# Patient Record
Sex: FEMALE | Race: OTHER | NOT HISPANIC OR LATINO | URBAN - METROPOLITAN AREA
[De-identification: names, ages, dates, MRNs, and addresses within clinical notes are randomized per-mention and may not be internally consistent; named-entity substitution may affect disease eponyms.]

---

## 2024-05-12 ENCOUNTER — EMERGENCY (EMERGENCY)
Facility: HOSPITAL | Age: 19
LOS: 1 days | Discharge: HOME | End: 2024-05-12
Attending: EMERGENCY MEDICINE | Admitting: EMERGENCY MEDICINE
Payer: MEDICAID

## 2024-05-12 VITALS
SYSTOLIC BLOOD PRESSURE: 118 MMHG | OXYGEN SATURATION: 99 % | TEMPERATURE: 98 F | DIASTOLIC BLOOD PRESSURE: 70 MMHG | RESPIRATION RATE: 16 BRPM | HEART RATE: 87 BPM

## 2024-05-12 VITALS
SYSTOLIC BLOOD PRESSURE: 110 MMHG | DIASTOLIC BLOOD PRESSURE: 71 MMHG | RESPIRATION RATE: 16 BRPM | TEMPERATURE: 99 F | OXYGEN SATURATION: 98 % | HEART RATE: 92 BPM

## 2024-05-12 PROCEDURE — 99283 EMERGENCY DEPT VISIT LOW MDM: CPT

## 2024-05-12 RX ORDER — CEPHALEXIN 500 MG
1 CAPSULE ORAL
Qty: 28 | Refills: 0
Start: 2024-05-12 | End: 2024-05-18

## 2024-05-12 RX ORDER — CEPHALEXIN 500 MG
500 CAPSULE ORAL ONCE
Refills: 0 | Status: COMPLETED | OUTPATIENT
Start: 2024-05-12 | End: 2024-05-12

## 2024-05-12 RX ADMIN — Medication 500 MILLIGRAM(S): at 22:47

## 2024-05-12 NOTE — ED PROVIDER NOTE - PATIENT PORTAL LINK FT
You can access the FollowMyHealth Patient Portal offered by [facility] by registering at the following website: [email or web address]. By joining [employer]’s FollowMyHealth portal, you will also be able to view your health information using other applications (apps) compatible with our system.

## 2024-05-12 NOTE — ED ADULT NURSE NOTE - NSFALLUNIVINTERV_ED_ALL_ED
Bed/Stretcher in lowest position, wheels locked, appropriate side rails in place/Call bell, personal items and telephone in reach/Instruct patient to call for assistance before getting out of bed/chair/stretcher/Non-slip footwear applied when patient is off stretcher/[place] to call system/Physically safe environment - no spills, clutter or unnecessary equipment/Purposeful proactive rounding/Room/bathroom lighting operational, light cord in reach

## 2024-05-12 NOTE — ED PROVIDER NOTE - PHYSICAL EXAMINATION
GENERAL: well appearing in no acute distress, non-toxic appearing  MSK: ROM intact, +right mid thigh ttp, firm/indurated  SKIN: +mid thigh scab with surrounding erythema approx 5x8 cm

## 2024-05-12 NOTE — ED PROVIDER NOTE - NSFOLLOWUPINSTRUCTIONS_ED_ALL_ED_FT
You are seen in the ED for your right mid thigh pain and redness after a popped pimple.  We diagnosed you with cellulitis, there is no sign of abscess.    We gave you a dose of antibiotics in the ED and sent a prescription for the remainder of your antibiotic course to your pharmacy.  Your antibiotic is Keflex, please take 1 tablet 4 times a day for the next 7 days.    You may take Tylenol, and/or Ibuprofen as needed for pain. Please follow dosing instructions on medication labeling.    SEEK IMMEDIATE MEDICAL CARE IF YOU HAVE ANY OF THE FOLLOWING SYMPTOMS: chills, fever, muscle aches, or red streaking from the area.

## 2024-05-12 NOTE — ED PROVIDER NOTE - NS ED ATTENDING STATEMENT MOD
I have seen and examined this patient and fully participated in the care of this patient as the teaching attending.  The service was shared with the CASSANDRA.  I reviewed and verified the documentation.

## 2024-05-12 NOTE — ED PROVIDER NOTE - CLINICAL SUMMARY MEDICAL DECISION MAKING FREE TEXT BOX
18-year-old female with no past medical or past surgical history presents with right mid thigh redness and pain.  States her symptoms started with a pimple in the mid thigh a week ago which popped and drained pus, however since then has noticed redness spreading from the area with increasing pain and mild swelling.  Any fever/chills, no history of previous abscess or cellulitis.  AVSS,+right mid thigh ttp, firm/indurated, +right mid thigh ttp, firm/indurated, no fluctuance.  History exam concerning for cellulitis, no area of fluctuance concerning for abscess, central area is scabbed over consistent with prior drained pimple/abscess.  Will provide antibiotics, send Rx to pharmacy, give follow-up and return precautions and discharge.

## 2024-05-12 NOTE — ED PROVIDER NOTE - ATTENDING CONTRIBUTION TO CARE
I saw and evaluated the patient. I discussed the case with the CASSANDRA/resident and agree with the findings and helped develop the plan of care as documented in the CASSANDRA/resident's note. I agree with the findings and plan of care as documented in the CASSANDRA/resident's note.

## 2024-05-15 DIAGNOSIS — L53.9 ERYTHEMATOUS CONDITION, UNSPECIFIED: ICD-10-CM

## 2024-05-15 DIAGNOSIS — L03.115 CELLULITIS OF RIGHT LOWER LIMB: ICD-10-CM

## 2024-05-15 DIAGNOSIS — Z91.018 ALLERGY TO OTHER FOODS: ICD-10-CM

## 2024-06-12 ENCOUNTER — INPATIENT (INPATIENT)
Facility: HOSPITAL | Age: 19
LOS: 0 days | Discharge: HOME | DRG: 872 | End: 2024-06-13
Attending: STUDENT IN AN ORGANIZED HEALTH CARE EDUCATION/TRAINING PROGRAM | Admitting: STUDENT IN AN ORGANIZED HEALTH CARE EDUCATION/TRAINING PROGRAM
Payer: MEDICAID

## 2024-06-12 VITALS
TEMPERATURE: 98 F | RESPIRATION RATE: 16 BRPM | WEIGHT: 119.93 LBS | SYSTOLIC BLOOD PRESSURE: 117 MMHG | DIASTOLIC BLOOD PRESSURE: 75 MMHG | HEART RATE: 109 BPM | HEIGHT: 61 IN | OXYGEN SATURATION: 99 %

## 2024-06-12 LAB
ANION GAP SERPL CALC-SCNC: 13 MMOL/L — SIGNIFICANT CHANGE UP (ref 9–16)
BUN SERPL-MCNC: 9 MG/DL — SIGNIFICANT CHANGE UP (ref 7–23)
CALCIUM SERPL-MCNC: 9.2 MG/DL — SIGNIFICANT CHANGE UP (ref 8.5–10.5)
CHLORIDE SERPL-SCNC: 96 MMOL/L — SIGNIFICANT CHANGE UP (ref 96–108)
CO2 SERPL-SCNC: 23 MMOL/L — SIGNIFICANT CHANGE UP (ref 22–31)
CREAT SERPL-MCNC: 1.09 MG/DL — SIGNIFICANT CHANGE UP (ref 0.5–1.3)
EGFR: 75 ML/MIN/1.73M2 — SIGNIFICANT CHANGE UP
GLUCOSE SERPL-MCNC: 134 MG/DL — HIGH (ref 70–99)
HCG SERPL-ACNC: <1 MIU/ML — SIGNIFICANT CHANGE UP
HCT VFR BLD CALC: 39.9 % — SIGNIFICANT CHANGE UP (ref 34.5–45)
HGB BLD-MCNC: 13.3 G/DL — SIGNIFICANT CHANGE UP (ref 11.5–15.5)
LACTATE BLDV-MCNC: 1.4 MMOL/L — SIGNIFICANT CHANGE UP (ref 0.5–2)
MCHC RBC-ENTMCNC: 28.8 PG — SIGNIFICANT CHANGE UP (ref 27–34)
MCHC RBC-ENTMCNC: 33.3 GM/DL — SIGNIFICANT CHANGE UP (ref 32–36)
MCV RBC AUTO: 86.4 FL — SIGNIFICANT CHANGE UP (ref 80–100)
NRBC # BLD: 0 /100 WBCS — SIGNIFICANT CHANGE UP (ref 0–0)
PLATELET # BLD AUTO: 232 K/UL — SIGNIFICANT CHANGE UP (ref 150–400)
POTASSIUM SERPL-MCNC: 3.4 MMOL/L — LOW (ref 3.5–5.3)
POTASSIUM SERPL-SCNC: 3.4 MMOL/L — LOW (ref 3.5–5.3)
RBC # BLD: 4.62 M/UL — SIGNIFICANT CHANGE UP (ref 3.8–5.2)
RBC # FLD: 12.2 % — SIGNIFICANT CHANGE UP (ref 10.3–14.5)
SODIUM SERPL-SCNC: 132 MMOL/L — SIGNIFICANT CHANGE UP (ref 132–145)
WBC # BLD: 13.71 K/UL — HIGH (ref 3.8–10.5)
WBC # FLD AUTO: 13.71 K/UL — HIGH (ref 3.8–10.5)

## 2024-06-12 PROCEDURE — 99285 EMERGENCY DEPT VISIT HI MDM: CPT

## 2024-06-12 PROCEDURE — 72193 CT PELVIS W/DYE: CPT | Mod: 26,MC

## 2024-06-12 RX ORDER — AMPICILLIN SODIUM AND SULBACTAM SODIUM 250; 125 MG/ML; MG/ML
INJECTION, POWDER, FOR SUSPENSION INTRAMUSCULAR; INTRAVENOUS
Refills: 0 | Status: DISCONTINUED | OUTPATIENT
Start: 2024-06-12 | End: 2024-06-13

## 2024-06-12 RX ORDER — SODIUM CHLORIDE 9 MG/ML
1000 INJECTION INTRAMUSCULAR; INTRAVENOUS; SUBCUTANEOUS ONCE
Refills: 0 | Status: COMPLETED | OUTPATIENT
Start: 2024-06-12 | End: 2024-06-12

## 2024-06-12 RX ORDER — VANCOMYCIN HCL 1 G
1000 VIAL (EA) INTRAVENOUS ONCE
Refills: 0 | Status: COMPLETED | OUTPATIENT
Start: 2024-06-12 | End: 2024-06-12

## 2024-06-12 RX ORDER — IOHEXOL 300 MG/ML
30 INJECTION, SOLUTION INTRAVENOUS ONCE
Refills: 0 | Status: COMPLETED | OUTPATIENT
Start: 2024-06-12 | End: 2024-06-12

## 2024-06-12 RX ORDER — AMPICILLIN SODIUM AND SULBACTAM SODIUM 250; 125 MG/ML; MG/ML
3 INJECTION, POWDER, FOR SUSPENSION INTRAMUSCULAR; INTRAVENOUS EVERY 6 HOURS
Refills: 0 | Status: DISCONTINUED | OUTPATIENT
Start: 2024-06-12 | End: 2024-06-13

## 2024-06-12 RX ORDER — EPINEPHRINE 0.3 MG/.3ML
0.3 INJECTION INTRAMUSCULAR; SUBCUTANEOUS ONCE
Refills: 0 | Status: COMPLETED | OUTPATIENT
Start: 2024-06-12 | End: 2024-06-12

## 2024-06-12 RX ORDER — AMPICILLIN SODIUM AND SULBACTAM SODIUM 250; 125 MG/ML; MG/ML
3 INJECTION, POWDER, FOR SUSPENSION INTRAMUSCULAR; INTRAVENOUS ONCE
Refills: 0 | Status: COMPLETED | OUTPATIENT
Start: 2024-06-12 | End: 2024-06-12

## 2024-06-12 RX ORDER — IPRATROPIUM/ALBUTEROL SULFATE 18-103MCG
3 AEROSOL WITH ADAPTER (GRAM) INHALATION ONCE
Refills: 0 | Status: COMPLETED | OUTPATIENT
Start: 2024-06-12 | End: 2024-06-12

## 2024-06-12 RX ORDER — FAMOTIDINE 10 MG/ML
20 INJECTION INTRAVENOUS ONCE
Refills: 0 | Status: COMPLETED | OUTPATIENT
Start: 2024-06-12 | End: 2024-06-12

## 2024-06-12 RX ORDER — DIPHENHYDRAMINE HCL 50 MG
50 CAPSULE ORAL ONCE
Refills: 0 | Status: COMPLETED | OUTPATIENT
Start: 2024-06-12 | End: 2024-06-12

## 2024-06-12 RX ADMIN — AMPICILLIN SODIUM AND SULBACTAM SODIUM 200 GRAM(S): 250; 125 INJECTION, POWDER, FOR SUSPENSION INTRAMUSCULAR; INTRAVENOUS at 12:42

## 2024-06-12 RX ADMIN — SODIUM CHLORIDE 1000 MILLILITER(S): 9 INJECTION INTRAMUSCULAR; INTRAVENOUS; SUBCUTANEOUS at 12:20

## 2024-06-12 RX ADMIN — EPINEPHRINE 0.3 MILLIGRAM(S): 0.3 INJECTION INTRAMUSCULAR; SUBCUTANEOUS at 10:40

## 2024-06-12 RX ADMIN — Medication 1000 MILLIGRAM(S): at 20:22

## 2024-06-12 RX ADMIN — AMPICILLIN SODIUM AND SULBACTAM SODIUM 3 GRAM(S): 250; 125 INJECTION, POWDER, FOR SUSPENSION INTRAMUSCULAR; INTRAVENOUS at 13:49

## 2024-06-12 RX ADMIN — AMPICILLIN SODIUM AND SULBACTAM SODIUM 200 GRAM(S): 250; 125 INJECTION, POWDER, FOR SUSPENSION INTRAMUSCULAR; INTRAVENOUS at 19:12

## 2024-06-12 RX ADMIN — SODIUM CHLORIDE 1000 MILLILITER(S): 9 INJECTION INTRAMUSCULAR; INTRAVENOUS; SUBCUTANEOUS at 07:43

## 2024-06-12 RX ADMIN — Medication 125 MILLIGRAM(S): at 10:45

## 2024-06-12 RX ADMIN — AMPICILLIN SODIUM AND SULBACTAM SODIUM 200 GRAM(S): 250; 125 INJECTION, POWDER, FOR SUSPENSION INTRAMUSCULAR; INTRAVENOUS at 07:38

## 2024-06-12 RX ADMIN — Medication 250 MILLIGRAM(S): at 07:43

## 2024-06-12 RX ADMIN — SODIUM CHLORIDE 1000 MILLILITER(S): 9 INJECTION INTRAMUSCULAR; INTRAVENOUS; SUBCUTANEOUS at 20:22

## 2024-06-12 RX ADMIN — AMPICILLIN SODIUM AND SULBACTAM SODIUM 3 GRAM(S): 250; 125 INJECTION, POWDER, FOR SUSPENSION INTRAMUSCULAR; INTRAVENOUS at 20:22

## 2024-06-12 RX ADMIN — AMPICILLIN SODIUM AND SULBACTAM SODIUM 200 GRAM(S): 250; 125 INJECTION, POWDER, FOR SUSPENSION INTRAMUSCULAR; INTRAVENOUS at 23:51

## 2024-06-12 RX ADMIN — IOHEXOL 30 MILLILITER(S): 300 INJECTION, SOLUTION INTRAVENOUS at 07:51

## 2024-06-12 RX ADMIN — AMPICILLIN SODIUM AND SULBACTAM SODIUM 3 GRAM(S): 250; 125 INJECTION, POWDER, FOR SUSPENSION INTRAMUSCULAR; INTRAVENOUS at 20:21

## 2024-06-12 RX ADMIN — Medication 50 MILLIGRAM(S): at 10:45

## 2024-06-12 RX ADMIN — Medication 3 MILLILITER(S): at 11:00

## 2024-06-12 RX ADMIN — FAMOTIDINE 20 MILLIGRAM(S): 10 INJECTION INTRAVENOUS at 10:45

## 2024-06-12 RX ADMIN — Medication 3 MILLILITER(S): at 10:50

## 2024-06-12 NOTE — ED PROVIDER NOTE - PHYSICAL EXAMINATION
Constitutional:  Well appearing, awake, alert, oriented  and in no apparent distress.  HEENT: Airway patent, Nasal mucosa clear. Mouth with normal mucosa.   Eyes: Clear bilaterally, pupils equal, round and reactive to light.  Cardiac: Normal rate, regular rhythm.  Respiratory: Breath sounds clear and equal bilaterally.  GI: Abdomen soft, non-tender, no guarding.  MSK: Spine appears normal, range of motion is not limited, no muscle or joint tenderness  Neuro: Alert and oriented, no focal deficits, no motor or sensory deficits.  Pelvis: Large 6x7 firm indurated mass to L lower abdomen/pelvis, minimal drainage of pus-like fluid upon expression, no communication within vaginal canal (chaperone was nurse Tessa)

## 2024-06-12 NOTE — ED ADULT NURSE REASSESSMENT NOTE - NS ED NURSE REASSESS COMMENT FT1
Called to patient bedside after CT scan, endorsing nausea and increased nasal congestion. FELICITA Sharp called to bedside to assess. Pt noted to have slight wheezing and hives with persistent nausea. IM Epi, 20mg pepcid, 120mg solumedrol, 50mg benadryl, 1000ml NS, and 3 duo-nebs given. Pt reassess and describes improvement in itchiness and congestion.
pt resting comfortably, denies any pain. pending admission at this time.
received pt from previous shift, endorsed from previous shift for continuity of care. pt presents to the ED d/t complaints of worsening abscess. pt received abx, pending CT with contrast at this time. endorses mild pain with movement, does not want any medication at this time.

## 2024-06-12 NOTE — ED PROVIDER NOTE - OBJECTIVE STATEMENT
18-year-old female with no past medical history presenting to the emergency room with swelling to the groin.  Patient states for the past 2 days she has noticed swelling in the left  lower pelvis, associated with redness, and occasional drainage when she pushes on it.  Also complaining of subjective fevers.  States that she had an abscess on her right thigh that was drained and also was cellulitic requiring antibiotics.  Non-smoker, states that she smokes "weed", no IVDU.  States she does wax.

## 2024-06-12 NOTE — ED ADULT NURSE REASSESSMENT NOTE - GENERAL PATIENT STATE
comfortable appearance/cooperative/no change observed/resting/sleeping
comfortable appearance/smiling/interactive

## 2024-06-12 NOTE — ED PROVIDER NOTE - BIRTH SEX
C/o headache since this afternoon. Had wisdom teeth removed yesterday. Took Percocet @ 1730 & 2030  Took Ibuprofen 400mg @ 1810. Female

## 2024-06-12 NOTE — ED PROVIDER NOTE - PROGRESS NOTE DETAILS
Signed out to me this morning pending imaging, reevaluation, IV antibiotics and possible admission.  CT scan shows no discrete area of abscess, no fluid collection.  Singular draining sinus, will obtain wound culture.  Patient had an allergic reaction to the IV contrast and CT scan.  Treated for allergy in the ED with complete resolution of symptoms. Accepted for admission to medicine by Dr. Hi.

## 2024-06-12 NOTE — ED PROVIDER NOTE - CLINICAL SUMMARY MEDICAL DECISION MAKING FREE TEXT BOX
A/P: 18-year-old female with no past medical history presenting to the emergency room with swelling to the groin.  Patient states for the past 2 days she has noticed swelling in the left  lower pelvis, associated with redness, and occasional drainage when she pushes on it.  Also complaining of subjective fevers.  States that she had an abscess on her right thigh that was drained and also was cellulitic requiring antibiotics.  Non-smoker, states that she smokes "weed", no IVDU.  States she does wax.  --  Differential includes but not limited to cellulitis, abscess, fistula  --   patient's abscess could possibly communicating a bowel or other pelvic organs given location and the size, will require CT of the pelvis with oral contrast and IV contrast  --   given tachycardia, possibly concern for sepsis, will give fluids and ABx

## 2024-06-13 VITALS
RESPIRATION RATE: 18 BRPM | DIASTOLIC BLOOD PRESSURE: 67 MMHG | TEMPERATURE: 98 F | HEART RATE: 82 BPM | SYSTOLIC BLOOD PRESSURE: 106 MMHG | OXYGEN SATURATION: 99 %

## 2024-06-13 DIAGNOSIS — Z29.9 ENCOUNTER FOR PROPHYLACTIC MEASURES, UNSPECIFIED: ICD-10-CM

## 2024-06-13 DIAGNOSIS — L03.90 CELLULITIS, UNSPECIFIED: ICD-10-CM

## 2024-06-13 DIAGNOSIS — A41.9 SEPSIS, UNSPECIFIED ORGANISM: ICD-10-CM

## 2024-06-13 LAB
ANION GAP SERPL CALC-SCNC: 12 MMOL/L — SIGNIFICANT CHANGE UP (ref 5–17)
BASOPHILS # BLD AUTO: 0.03 K/UL — SIGNIFICANT CHANGE UP (ref 0–0.2)
BASOPHILS NFR BLD AUTO: 0.2 % — SIGNIFICANT CHANGE UP (ref 0–2)
BUN SERPL-MCNC: 7 MG/DL — SIGNIFICANT CHANGE UP (ref 7–23)
CALCIUM SERPL-MCNC: 9.6 MG/DL — SIGNIFICANT CHANGE UP (ref 8.4–10.5)
CHLORIDE SERPL-SCNC: 104 MMOL/L — SIGNIFICANT CHANGE UP (ref 96–108)
CO2 SERPL-SCNC: 24 MMOL/L — SIGNIFICANT CHANGE UP (ref 22–31)
CREAT SERPL-MCNC: 0.59 MG/DL — SIGNIFICANT CHANGE UP (ref 0.5–1.3)
EGFR: 133 ML/MIN/1.73M2 — SIGNIFICANT CHANGE UP
EOSINOPHIL # BLD AUTO: 0.03 K/UL — SIGNIFICANT CHANGE UP (ref 0–0.5)
EOSINOPHIL NFR BLD AUTO: 0.2 % — SIGNIFICANT CHANGE UP (ref 0–6)
GLUCOSE SERPL-MCNC: 161 MG/DL — HIGH (ref 70–99)
GRAM STN FLD: ABNORMAL
HBV CORE AB SER-ACNC: SIGNIFICANT CHANGE UP
HBV SURFACE AB SER-ACNC: REACTIVE
HBV SURFACE AG SER-ACNC: SIGNIFICANT CHANGE UP
HCT VFR BLD CALC: 36.1 % — SIGNIFICANT CHANGE UP (ref 34.5–45)
HCV AB S/CO SERPL IA: 0.06 S/CO — SIGNIFICANT CHANGE UP
HCV AB SERPL-IMP: SIGNIFICANT CHANGE UP
HGB BLD-MCNC: 11.9 G/DL — SIGNIFICANT CHANGE UP (ref 11.5–15.5)
HIV 1+2 AB+HIV1 P24 AG SERPL QL IA: SIGNIFICANT CHANGE UP
IMM GRANULOCYTES NFR BLD AUTO: 0.6 % — SIGNIFICANT CHANGE UP (ref 0–0.9)
LYMPHOCYTES # BLD AUTO: 1.3 K/UL — SIGNIFICANT CHANGE UP (ref 1–3.3)
LYMPHOCYTES # BLD AUTO: 8.1 % — LOW (ref 13–44)
MAGNESIUM SERPL-MCNC: 2 MG/DL — SIGNIFICANT CHANGE UP (ref 1.6–2.6)
MCHC RBC-ENTMCNC: 28.4 PG — SIGNIFICANT CHANGE UP (ref 27–34)
MCHC RBC-ENTMCNC: 33 GM/DL — SIGNIFICANT CHANGE UP (ref 32–36)
MCV RBC AUTO: 86.2 FL — SIGNIFICANT CHANGE UP (ref 80–100)
MONOCYTES # BLD AUTO: 1.05 K/UL — HIGH (ref 0–0.9)
MONOCYTES NFR BLD AUTO: 6.6 % — SIGNIFICANT CHANGE UP (ref 2–14)
NEUTROPHILS # BLD AUTO: 13.45 K/UL — HIGH (ref 1.8–7.4)
NEUTROPHILS NFR BLD AUTO: 84.3 % — HIGH (ref 43–77)
NRBC # BLD: 0 /100 WBCS — SIGNIFICANT CHANGE UP (ref 0–0)
PHOSPHATE SERPL-MCNC: 2.8 MG/DL — SIGNIFICANT CHANGE UP (ref 2.5–4.5)
PLATELET # BLD AUTO: 230 K/UL — SIGNIFICANT CHANGE UP (ref 150–400)
POTASSIUM SERPL-MCNC: 3.7 MMOL/L — SIGNIFICANT CHANGE UP (ref 3.5–5.3)
POTASSIUM SERPL-SCNC: 3.7 MMOL/L — SIGNIFICANT CHANGE UP (ref 3.5–5.3)
RBC # BLD: 4.19 M/UL — SIGNIFICANT CHANGE UP (ref 3.8–5.2)
RBC # FLD: 12.4 % — SIGNIFICANT CHANGE UP (ref 10.3–14.5)
SODIUM SERPL-SCNC: 140 MMOL/L — SIGNIFICANT CHANGE UP (ref 135–145)
SPECIMEN SOURCE: SIGNIFICANT CHANGE UP
WBC # BLD: 15.96 K/UL — HIGH (ref 3.8–10.5)
WBC # FLD AUTO: 15.96 K/UL — HIGH (ref 3.8–10.5)

## 2024-06-13 PROCEDURE — 99285 EMERGENCY DEPT VISIT HI MDM: CPT

## 2024-06-13 PROCEDURE — 83605 ASSAY OF LACTIC ACID: CPT

## 2024-06-13 PROCEDURE — 87186 SC STD MICRODIL/AGAR DIL: CPT

## 2024-06-13 PROCEDURE — 87340 HEPATITIS B SURFACE AG IA: CPT

## 2024-06-13 PROCEDURE — 86803 HEPATITIS C AB TEST: CPT

## 2024-06-13 PROCEDURE — 86704 HEP B CORE ANTIBODY TOTAL: CPT

## 2024-06-13 PROCEDURE — 36415 COLL VENOUS BLD VENIPUNCTURE: CPT

## 2024-06-13 PROCEDURE — 85025 COMPLETE CBC W/AUTO DIFF WBC: CPT

## 2024-06-13 PROCEDURE — 87205 SMEAR GRAM STAIN: CPT

## 2024-06-13 PROCEDURE — 87077 CULTURE AEROBIC IDENTIFY: CPT

## 2024-06-13 PROCEDURE — 96361 HYDRATE IV INFUSION ADD-ON: CPT

## 2024-06-13 PROCEDURE — 96375 TX/PRO/DX INJ NEW DRUG ADDON: CPT

## 2024-06-13 PROCEDURE — 99236 HOSP IP/OBS SAME DATE HI 85: CPT | Mod: GC

## 2024-06-13 PROCEDURE — 86780 TREPONEMA PALLIDUM: CPT

## 2024-06-13 PROCEDURE — 72193 CT PELVIS W/DYE: CPT | Mod: MC

## 2024-06-13 PROCEDURE — 87070 CULTURE OTHR SPECIMN AEROBIC: CPT

## 2024-06-13 PROCEDURE — 83735 ASSAY OF MAGNESIUM: CPT

## 2024-06-13 PROCEDURE — 96374 THER/PROPH/DIAG INJ IV PUSH: CPT

## 2024-06-13 PROCEDURE — 96372 THER/PROPH/DIAG INJ SC/IM: CPT

## 2024-06-13 PROCEDURE — 84100 ASSAY OF PHOSPHORUS: CPT

## 2024-06-13 PROCEDURE — 94640 AIRWAY INHALATION TREATMENT: CPT

## 2024-06-13 PROCEDURE — 84702 CHORIONIC GONADOTROPIN TEST: CPT

## 2024-06-13 PROCEDURE — 86706 HEP B SURFACE ANTIBODY: CPT

## 2024-06-13 PROCEDURE — 87150 DNA/RNA AMPLIFIED PROBE: CPT

## 2024-06-13 PROCEDURE — 85027 COMPLETE CBC AUTOMATED: CPT

## 2024-06-13 PROCEDURE — 87389 HIV-1 AG W/HIV-1&-2 AB AG IA: CPT

## 2024-06-13 PROCEDURE — 87040 BLOOD CULTURE FOR BACTERIA: CPT

## 2024-06-13 PROCEDURE — 80048 BASIC METABOLIC PNL TOTAL CA: CPT

## 2024-06-13 RX ORDER — CEFAZOLIN SODIUM 1 G
VIAL (EA) INJECTION
Refills: 0 | Status: DISCONTINUED | OUTPATIENT
Start: 2024-06-13 | End: 2024-06-13

## 2024-06-13 RX ORDER — VANCOMYCIN HCL 1 G
1000 VIAL (EA) INTRAVENOUS EVERY 12 HOURS
Refills: 0 | Status: DISCONTINUED | OUTPATIENT
Start: 2024-06-14 | End: 2024-06-13

## 2024-06-13 RX ORDER — POTASSIUM CHLORIDE 20 MEQ
20 PACKET (EA) ORAL ONCE
Refills: 0 | Status: COMPLETED | OUTPATIENT
Start: 2024-06-13 | End: 2024-06-13

## 2024-06-13 RX ORDER — VANCOMYCIN HCL 1 G
VIAL (EA) INTRAVENOUS
Refills: 0 | Status: DISCONTINUED | OUTPATIENT
Start: 2024-06-13 | End: 2024-06-13

## 2024-06-13 RX ORDER — CEFAZOLIN SODIUM 1 G
1000 VIAL (EA) INJECTION EVERY 8 HOURS
Refills: 0 | Status: DISCONTINUED | OUTPATIENT
Start: 2024-06-13 | End: 2024-06-13

## 2024-06-13 RX ORDER — POTASSIUM CHLORIDE 20 MEQ
40 PACKET (EA) ORAL ONCE
Refills: 0 | Status: COMPLETED | OUTPATIENT
Start: 2024-06-13 | End: 2024-06-13

## 2024-06-13 RX ORDER — CEFAZOLIN SODIUM 1 G
1000 VIAL (EA) INJECTION ONCE
Refills: 0 | Status: COMPLETED | OUTPATIENT
Start: 2024-06-13 | End: 2024-06-13

## 2024-06-13 RX ORDER — ACETAMINOPHEN 500 MG
650 TABLET ORAL EVERY 6 HOURS
Refills: 0 | Status: DISCONTINUED | OUTPATIENT
Start: 2024-06-13 | End: 2024-06-13

## 2024-06-13 RX ORDER — VANCOMYCIN HCL 1 G
1000 VIAL (EA) INTRAVENOUS ONCE
Refills: 0 | Status: DISCONTINUED | OUTPATIENT
Start: 2024-06-13 | End: 2024-06-13

## 2024-06-13 RX ADMIN — Medication 100 MILLIGRAM(S): at 03:42

## 2024-06-13 RX ADMIN — Medication 40 MILLIEQUIVALENT(S): at 03:41

## 2024-06-13 RX ADMIN — Medication 20 MILLIEQUIVALENT(S): at 12:22

## 2024-06-13 NOTE — DISCHARGE NOTE NURSING/CASE MANAGEMENT/SOCIAL WORK - NSDCPEFALRISK_GEN_ALL_CORE
For information on Fall & Injury Prevention, visit: [email or web address] OR  [email or web address] OR  https://www.cdc.gov/steadi/patient.html

## 2024-06-13 NOTE — H&P ADULT - NSHPPHYSICALEXAM_GEN_ALL_CORE
VITALS:   T(C): 36.7 (06-13-24 @ 00:30), Max: 36.9 (06-12-24 @ 21:28)  HR: 72 (06-13-24 @ 00:30) (59 - 114)  BP: 104/53 (06-13-24 @ 00:30) (102/65 - 126/60)  RR: 18 (06-13-24 @ 00:30) (16 - 18)  SpO2: 99% (06-13-24 @ 00:30) (98% - 100%)    GENERAL: no acute distress, lying in bed comfortably  HEAD:  Atraumatic, normocephalic  EYES: EOMI, PERRLA, conjunctiva and sclera clear  ENT: Moist mucous membranes  NECK: Supple, no JVD  HEART: Regular rate and rhythm, no murmurs, rubs, or gallops  LUNGS: Unlabored respirations.  Clear to auscultation bilaterally, no crackles, wheezing, or rhonchi  ABDOMEN: Soft, nontender, nondistended, +BS  EXTREMITIES: 2+ peripheral pulses bilaterally. No clubbing, cyanosis, or edema  : Left pubic area swelling 5cm x 7cm, no fluctuence  NERVOUS SYSTEM:  A&Ox3, no focal deficits   SKIN: No rashes or lesions

## 2024-06-13 NOTE — H&P ADULT - PROBLEM SELECTOR PLAN 2
Patient with area of redness, swelling, pain left pubic area, which has been draining clear and subsequently fellow fluid. CT pelvis shows 9.0 cm area of cellulitis in the left lower pelvic anterior subcutaneous tissues, with associated external iliac lymphadenopathy.  - Plan as above

## 2024-06-13 NOTE — H&P ADULT - ASSESSMENT
18-year-old female with history of eczema presented to the emergency room with swelling in left groin, admitted for cellulitis.

## 2024-06-13 NOTE — H&P ADULT - HISTORY OF PRESENT ILLNESS
18-year-old female with history of eczema presented to the emergency room with swelling in left groin.  Patient reports she got waxed in that area on Friday, by Sunday there were small white pimples which she picked, subsequently the area started to swell and on Monday there was drainage or clear and then yellow fluid.  She also reports the general area was red and painful.  She reports fever, no chills.  She does not take any medications regularly.  No cigarette or alcohol use, smokes marijuana several times a week.  Sexually active with one male partner, no recent STD testing.  ROS otherwise negative.      ED Course  Vitals: Temp 98.2,  -->76, /75, RR 16, SaO2 99% room air   Labs: WBC 13.71, K 3.4, Lactate 1.4  CT Pelvis: There is a 9.0 cm area of cellulitis in the left lower pelvic anterior subcutaneous tissues, with associated external iliac lymphadenopathy.  Intervention: Unasyn 3g IV, Benadryl 50mg IV, Epinephrine 0.3mg, Famotidine 20mg IV, Methylprednisolone 125mg IV, Vancomycin n1g IV, NaCl 1L x 2, Duoneb x 2

## 2024-06-13 NOTE — H&P ADULT - PROBLEM SELECTOR PLAN 1
Patient met 2 SIRS on admission (, WBC 13.71) likely in setting of cellulitis.  Patient without cough or other signs or symptoms of respiratory infection. No dysuria, UTI unlikely.  No GI symptoms.  Lactate 1.4. CT pelvis shows 9.0 cm area of cellulitis in the left lower pelvic anterior subcutaneous tissues, with associated external iliac lymphadenopathy.  In the ED she received 2 x 1L NS bolus, 1g IV Vancomycin.  - Follow up wound culture   - Continue Vancomycin and deescalate as appropriate Patient met 2 SIRS on admission (, WBC 13.71) likely in setting of cellulitis.  Patient without cough or other signs or symptoms of respiratory infection. No dysuria, UTI unlikely.  No GI symptoms.  Lactate 1.4. CT pelvis shows 9.0 cm area of cellulitis in the left lower pelvic anterior subcutaneous tissues, with associated external iliac lymphadenopathy. No evidence of abscess or fistula.  In the ED she received 2 x 1L NS bolus, 1g IV Vancomycin.  - Follow up wound culture   - Continue Vancomycin and deescalate as appropriate

## 2024-06-13 NOTE — DISCHARGE NOTE PROVIDER - NSDCCPCAREPLAN_GEN_ALL_CORE_FT
PRINCIPAL DISCHARGE DIAGNOSIS  Diagnosis: Cellulitis  Assessment and Plan of Treatment: Cellulitis is a deep infection of the skin caused by bacteria. Normal skin can be affected by cellulitis, but it usually happens after some type of injury causes a skin break, including trauma or surgery. You were treated with IV antibiotics and will continue to take oral antibiotics for 6 more days. Please follow up with your PCP within 1-2 weeks of discharge. We also took blood cultures and follow up with you if you are found to have infection that has spread to your blood stream.

## 2024-06-13 NOTE — H&P ADULT - ATTENDING COMMENTS
18-year-old female with history of eczema presented to the emergency room with swelling in left groin, admitted for cellulitis.  Patient was seen and examined during morning rounds   Patient clinically much improved since admission on abx. Will likely d/c later on PO abx with outpatient f/u w/ pcp     Rest as per resident note

## 2024-06-13 NOTE — DISCHARGE NOTE PROVIDER - HOSPITAL COURSE
#Discharge: do not delete    18-year-old female with history of eczema presented to the emergency room with swelling in left groin, admitted for cellulitis    # Sepsis  Patient met 2 SIRS on admission (, WBC 13.71) likely in setting of cellulitis.  Patient without cough or other signs or symptoms of respiratory infection. No dysuria, UTI unlikely.  No GI symptoms.  Lactate 1.4. CT pelvis shows 9.0 cm area of cellulitis in the left lower pelvic anterior subcutaneous tissues, with associated external iliac lymphadenopathy. No evidence of abscess or fistula.  In the ED she received 2 x 1L NS bolus, 1g IV Vancomycin. Wound cultures positive for gram positive cocci in pairs.  - c/w bactrim for 7 days total  - f/u blood cultures outpatient, will call patient if positive    # Cellulitis.   ·  Plan: Patient with area of redness, swelling, pain left pubic area, which has been draining clear and subsequently fellow fluid. CT pelvis shows 9.0 cm area of cellulitis in the left lower pelvic anterior subcutaneous tissues, with associated external iliac lymphadenopathy.  - Plan as above.    Patient was discharged to: home    New medications:  bactrim  Changes to old medications: none  Medications that were stopped: none    Items to follow up as outpatient: PCP appointment    Physical exam at the time of discharge:  GENERAL: NAD, lying in bed comfortably  HEAD:  Atraumatic, normocephalic  EYES: EOMI, PERRLA, conjunctiva and sclera clear  NECK: Supple, trachea midline, no JVD  HEART: Regular rate and rhythm, no murmurs, rubs, or gallops  LUNGS: Unlabored respirations.  Clear to auscultation bilaterally, no crackles, wheezing, or rhonchi  ABDOMEN: Soft, nontender, nondistended, +BS  EXTREMITIES: 2+ peripheral pulses bilaterally. No clubbing, cyanosis, or edema  NERVOUS SYSTEM:  A&Ox3, moving all extremities, no focal deficits   GROIN: L groin cellulitis tender but without drainage       #Discharge: do not delete    18-year-old female with history of eczema presented to the emergency room with swelling in left groin, admitted for cellulitis    # Sepsis  Patient met 2 SIRS on admission (, WBC 13.71) likely in setting of cellulitis.  Patient without cough or other signs or symptoms of respiratory infection. No dysuria, UTI unlikely.  No GI symptoms.  Lactate 1.4. CT pelvis shows 9.0 cm area of cellulitis in the left lower pelvic anterior subcutaneous tissues, with associated external iliac lymphadenopathy. No evidence of abscess or fistula.  In the ED she received 2 x 1L NS bolus, 1g IV Vancomycin. Wound cultures positive for gram positive cocci in pairs.  - c/w bactrim for 7 days total  - f/u blood cultures outpatient, will call patient if positive    # Cellulitis.   ·  Plan: Patient with area of redness, swelling, pain left pubic area, which has been draining clear and subsequently fellow fluid. CT pelvis shows 9.0 cm area of cellulitis in the left lower pelvic anterior subcutaneous tissues, with associated external iliac lymphadenopathy.  - Plan as above.    #contrast allergy  - anaphylactic reaction to contrast, please avoid in the future    Patient was discharged to: home    New medications:  bactrim  Changes to old medications: none  Medications that were stopped: none    Items to follow up as outpatient: PCP appointment    Physical exam at the time of discharge:  GENERAL: NAD, lying in bed comfortably  HEAD:  Atraumatic, normocephalic  EYES: EOMI, PERRLA, conjunctiva and sclera clear  NECK: Supple, trachea midline, no JVD  HEART: Regular rate and rhythm, no murmurs, rubs, or gallops  LUNGS: Unlabored respirations.  Clear to auscultation bilaterally, no crackles, wheezing, or rhonchi  ABDOMEN: Soft, nontender, nondistended, +BS  EXTREMITIES: 2+ peripheral pulses bilaterally. No clubbing, cyanosis, or edema  NERVOUS SYSTEM:  A&Ox3, moving all extremities, no focal deficits   GROIN: L groin cellulitis tender but without drainage

## 2024-06-13 NOTE — DISCHARGE NOTE PROVIDER - ATTENDING DISCHARGE PHYSICAL EXAMINATION:
GENERAL: NAD, lying in bed comfortably  HEAD:  Atraumatic, normocephalic  EYES: EOMI, PERRLA, conjunctiva and sclera clear  NECK: Supple, trachea midline, no JVD  HEART: Regular rate and rhythm, no murmurs, rubs, or gallops  LUNGS: Unlabored respirations.  Clear to auscultation bilaterally, no crackles, wheezing, or rhonchi  ABDOMEN: Soft, nontender, nondistended, +BS  EXTREMITIES: 2+ peripheral pulses bilaterally. No clubbing, cyanosis, or edema  NERVOUS SYSTEM:  A&Ox3, moving all extremities, no focal deficits   GROIN: L groin cellulitis tender but without drainage

## 2024-06-13 NOTE — DISCHARGE NOTE PROVIDER - NSDCCPTREATMENT_GEN_ALL_CORE_FT
PRINCIPAL PROCEDURE  Procedure: CT abdomen pelvis  Findings and Treatment: INTERPRETATION:  CLINICAL INFORMATION: Large pelvic abscess.  COMPARISON: None.  CONTRAST/COMPLICATIONS:  IV Contrast: Omnipaque 350  96 cc administered   4 cc discarded  Oral Contrast: Omnipaque 300  Complications: None reported at time of study completion  PROCEDURE:  CT of the Pelvis was performed.  Sagittal and coronal reformats were performed.  FINDINGS:  BLADDER: Within normal limits.  REPRODUCTIVE ORGANS: Uterus and adnexa within normal limits. 2.3 cm x 1.6   cm dominant follicle in the left ovary.  LYMPH NODES: Bilateral external iliac lymphadenopathy.  VISUALIZED PORTIONS:  ABDOMINAL ORGANS: Within normal limits.  BOWEL: Within normal limits.  PERITONEUM/RETROPERITONEUM: Within normal limits.  VESSELS: Within normal limits.  ABDOMINAL WALL: There is a 3.4 x 9.0 x 7.4 cm area of inflammation and   fat stranding without a well-defined capsule in the left lower pelvis   anterior subcutaneous tissues with connection to the labia.  BONES: Within normal limits.  IMPRESSION:  There is a 9.0 cm area of cellulitis in the left lower pelvic anterior   subcutaneous tissues, with associated external iliac lymphadenopathy.  --- End of Report ---

## 2024-06-13 NOTE — H&P ADULT - NSHPLABSRESULTS_GEN_ALL_CORE
INTERVAL HPI/OVERNIGHT EVENTS:  Patient was seen and examined at bedside. As per nurse and patient, no o/n events, patient resting comfortably. No complaints at this time. Patient denies: fever, chills, dizziness, weakness, HA, Changes in vision, CP, palpitations, SOB, cough, N/V/D/C, dysuria, changes in bowel movements, LE edema. ROS otherwise negative.    VITAL SIGNS:  T(F): 98 (06-13-24 @ 00:30)  HR: 72 (06-13-24 @ 00:30)  BP: 104/53 (06-13-24 @ 00:30)  RR: 18 (06-13-24 @ 00:30)  SpO2: 99% (06-13-24 @ 00:30)  Wt(kg): --    PHYSICAL EXAM:    Constitutional: WDWN, NAD  HEENT: PERRL, EOMI, sclera non-icteric, neck supple, trachea midline, no masses, no JVD, MMM, good dentition  Respiratory: CTA b/l, good air entry b/l, no wheezing, no rhonchi, no rales, without accessory muscle use and no intercostal retractions  Cardiovascular: RRR, normal S1S2, no M/R/G  Gastrointestinal: soft, NTND, no masses palpable, BS normal  Extremities: Warm, well perfused, pulses equal bilateral upper and lower extremities, no edema, no clubbing  Neurological: AAOx3, CN Grossly intact  Skin: Normal temperature, warm, dry    MEDICATIONS  (STANDING):  ampicillin/sulbactam  IVPB      ampicillin/sulbactam  IVPB 3 Gram(s) IV Intermittent every 6 hours    MEDICATIONS  (PRN):      Allergies    IV Contrast (Short breath; Hives)  Pistachios (Hives)  Cashews (Hives)    Intolerances        LABS:                        13.3   13.71 )-----------( 232      ( 12 Jun 2024 07:25 )             39.9     06-12    132  |  96  |  9   ----------------------------<  134<H>  3.4<L>   |  23  |  1.09    Ca    9.2      12 Jun 2024 07:25    Urinalysis Basic - ( 12 Jun 2024 07:25 )    Color: x / Appearance: x / SG: x / pH: x  Gluc: 134 mg/dL / Ketone: x  / Bili: x / Urobili: x   Blood: x / Protein: x / Nitrite: x   Leuk Esterase: x / RBC: x / WBC x   Sq Epi: x / Non Sq Epi: x / Bacteria: x    RADIOLOGY & ADDITIONAL TESTS:  Reviewed

## 2024-06-13 NOTE — DISCHARGE NOTE NURSING/CASE MANAGEMENT/SOCIAL WORK - PATIENT PORTAL LINK FT
You can access the FollowMyHealth Patient Portal offered by [facility] by registering at the following website: [email or web address]. By joining [employer]’s FollowMyHealth portal, you will also be able to view your health information using other applications (apps) compatible with our system.

## 2024-06-13 NOTE — H&P ADULT - PROBLEM SELECTOR PLAN 3
None known
F: s/p 2 x 1L NS  E: replete PRN K>4, Mg>2  N: regular diet   GI: none  DVT: None, improve score 0  Dispo: [facility]  Code: Full

## 2024-06-14 LAB
-  CLINDAMYCIN: SIGNIFICANT CHANGE UP
-  ERYTHROMYCIN: SIGNIFICANT CHANGE UP
-  GENTAMICIN: SIGNIFICANT CHANGE UP
-  LINEZOLID: SIGNIFICANT CHANGE UP
-  OXACILLIN: SIGNIFICANT CHANGE UP
-  PENICILLIN: SIGNIFICANT CHANGE UP
-  RIFAMPIN: SIGNIFICANT CHANGE UP
-  TETRACYCLINE: SIGNIFICANT CHANGE UP
-  TRIMETHOPRIM/SULFAMETHOXAZOLE: SIGNIFICANT CHANGE UP
-  VANCOMYCIN: SIGNIFICANT CHANGE UP
METHOD TYPE: SIGNIFICANT CHANGE UP
T PALLIDUM AB TITR SER: NEGATIVE — SIGNIFICANT CHANGE UP

## 2024-06-16 LAB
-  STAPHYLOCOCCUS EPIDERMIDIS: SIGNIFICANT CHANGE UP
GRAM STN FLD: ABNORMAL
METHOD TYPE: SIGNIFICANT CHANGE UP

## 2024-06-17 LAB
CULTURE RESULTS: ABNORMAL
CULTURE RESULTS: ABNORMAL
ORGANISM # SPEC MICROSCOPIC CNT: ABNORMAL
ORGANISM # SPEC MICROSCOPIC CNT: ABNORMAL
ORGANISM # SPEC MICROSCOPIC CNT: SIGNIFICANT CHANGE UP
ORGANISM # SPEC MICROSCOPIC CNT: SIGNIFICANT CHANGE UP
SPECIMEN SOURCE: SIGNIFICANT CHANGE UP
SPECIMEN SOURCE: SIGNIFICANT CHANGE UP

## 2024-06-20 DIAGNOSIS — L03.314 CELLULITIS OF GROIN: ICD-10-CM

## 2024-06-20 DIAGNOSIS — A41.9 SEPSIS, UNSPECIFIED ORGANISM: ICD-10-CM

## 2024-06-20 DIAGNOSIS — F12.90 CANNABIS USE, UNSPECIFIED, UNCOMPLICATED: ICD-10-CM

## 2024-06-20 DIAGNOSIS — Z91.018 ALLERGY TO OTHER FOODS: ICD-10-CM

## 2024-06-20 DIAGNOSIS — B96.89 OTHER SPECIFIED BACTERIAL AGENTS AS THE CAUSE OF DISEASES CLASSIFIED ELSEWHERE: ICD-10-CM

## 2024-06-20 DIAGNOSIS — L30.9 DERMATITIS, UNSPECIFIED: ICD-10-CM

## 2024-06-20 DIAGNOSIS — Z91.041 RADIOGRAPHIC DYE ALLERGY STATUS: ICD-10-CM

## 2025-03-14 ENCOUNTER — EMERGENCY (EMERGENCY)
Facility: HOSPITAL | Age: 20
LOS: 1 days | Discharge: HOME | End: 2025-03-14
Admitting: EMERGENCY MEDICINE
Payer: MEDICAID

## 2025-03-14 VITALS
SYSTOLIC BLOOD PRESSURE: 144 MMHG | HEART RATE: 94 BPM | WEIGHT: 115.08 LBS | TEMPERATURE: 98 F | RESPIRATION RATE: 16 BRPM | OXYGEN SATURATION: 100 % | DIASTOLIC BLOOD PRESSURE: 89 MMHG

## 2025-03-14 PROCEDURE — 99283 EMERGENCY DEPT VISIT LOW MDM: CPT

## 2025-03-14 RX ORDER — SULFAMETHOXAZOLE/TRIMETHOPRIM 800-160 MG
1 TABLET ORAL ONCE
Refills: 0 | Status: COMPLETED | OUTPATIENT
Start: 2025-03-14 | End: 2025-03-14

## 2025-03-14 RX ORDER — SULFAMETHOXAZOLE/TRIMETHOPRIM 800-160 MG
1 TABLET ORAL
Qty: 20 | Refills: 0
Start: 2025-03-14 | End: 2025-03-23

## 2025-03-14 RX ADMIN — Medication 1 TABLET(S): at 19:56

## 2025-03-14 NOTE — ED PROVIDER NOTE - PHYSICAL EXAMINATION
Physical Exam  GEN: Awake, alert, non-toxic appearing, NCAT  EYES: PERRL, full EOMI,  ENT: External inspection normal, normal voice, no oropharyngeal ulcerations/lesions/swelling  HEAD: atraumatic  NECK: FROM neck, supple, no meningismus, trachea midline, no JVD  MSK: FROM all 4 extremities, N/V intact,   -drained small abscess/folliculitis on left inner thigh. Multiple areas of folliculitis   SKIN: Color normal for race, warm and dry, no rash  NEURO: Oriented x3, CN 2-12 grossly intact, normal motor, normal sensory

## 2025-03-14 NOTE — ED ADULT NURSE NOTE - OBJECTIVE STATEMENT
Pt presents to ED for wound check. Pt states she has several abscesses to right groin and back of thigh. States they usually drain on their own. Pt also states one drained today but would like evaluation.

## 2025-03-14 NOTE — ED PROVIDER NOTE - OBJECTIVE STATEMENT
19-year-old female now coming in stating she has had recurrent abscesses drain and she is coming in to get her wounds evaluated.  States she has not been shaving at all.  Wears loose clothing.  Has not followed up with dermatology or PCP.  States abscess she wants looked at is on her inner thigh..  States it is already draining.  Patient appears well no acute distress.  Denies nausea, vomiting, fevers, chills.

## 2025-03-14 NOTE — ED ADULT TRIAGE NOTE - CHIEF COMPLAINT QUOTE
Walk in for wound check. Reports abscesses to Rt groin, bilateral posterior thighs. States she has been experiencing recurring abscesses to suprapubic area and legs which topically self drain. Reports current abscesses drained this morning but "wants to make sure its all out". VSS

## 2025-03-14 NOTE — ED PROVIDER NOTE - PATIENT PORTAL LINK FT
You can access the FollowMyHealth Patient Portal offered by [facility] by registering at the following website: [email or web address]. By joining [employer]’s FollowMyHealth portal, you will also be able to view your health information using other applications (apps) compatible with our system.

## 2025-03-14 NOTE — ED PROVIDER NOTE - CLINICAL SUMMARY MEDICAL DECISION MAKING FREE TEXT BOX
Patient here with multiple areas of folliculitis with 1 area of drained abscess/ folliculitis on left inner thigh that small.  No obvious signs of cellulitis.    Will cover with antibiotics and have patient follow-up with dermatology

## 2025-03-17 DIAGNOSIS — L73.9 FOLLICULAR DISORDER, UNSPECIFIED: ICD-10-CM

## 2025-03-17 DIAGNOSIS — Z91.018 ALLERGY TO OTHER FOODS: ICD-10-CM
